# Patient Record
Sex: FEMALE | Race: NATIVE HAWAIIAN OR OTHER PACIFIC ISLANDER | HISPANIC OR LATINO
[De-identification: names, ages, dates, MRNs, and addresses within clinical notes are randomized per-mention and may not be internally consistent; named-entity substitution may affect disease eponyms.]

---

## 2020-10-08 PROBLEM — Z00.00 ENCOUNTER FOR PREVENTIVE HEALTH EXAMINATION: Status: ACTIVE | Noted: 2020-10-08

## 2020-11-04 ENCOUNTER — APPOINTMENT (OUTPATIENT)
Dept: CARDIOLOGY | Facility: CLINIC | Age: 75
End: 2020-11-04
Payer: MEDICARE

## 2020-11-04 ENCOUNTER — NON-APPOINTMENT (OUTPATIENT)
Age: 75
End: 2020-11-04

## 2020-11-04 VITALS
WEIGHT: 126 LBS | HEIGHT: 63 IN | DIASTOLIC BLOOD PRESSURE: 60 MMHG | HEART RATE: 67 BPM | SYSTOLIC BLOOD PRESSURE: 126 MMHG | TEMPERATURE: 98.7 F | BODY MASS INDEX: 22.32 KG/M2

## 2020-11-04 DIAGNOSIS — Z78.9 OTHER SPECIFIED HEALTH STATUS: ICD-10-CM

## 2020-11-04 DIAGNOSIS — R73.03 PREDIABETES.: ICD-10-CM

## 2020-11-04 DIAGNOSIS — Z87.898 PERSONAL HISTORY OF OTHER SPECIFIED CONDITIONS: ICD-10-CM

## 2020-11-04 DIAGNOSIS — G45.9 TRANSIENT CEREBRAL ISCHEMIC ATTACK, UNSPECIFIED: ICD-10-CM

## 2020-11-04 DIAGNOSIS — Z80.42 FAMILY HISTORY OF MALIGNANT NEOPLASM OF PROSTATE: ICD-10-CM

## 2020-11-04 DIAGNOSIS — E78.5 HYPERLIPIDEMIA, UNSPECIFIED: ICD-10-CM

## 2020-11-04 DIAGNOSIS — R00.2 PALPITATIONS: ICD-10-CM

## 2020-11-04 PROCEDURE — 99204 OFFICE O/P NEW MOD 45 MIN: CPT

## 2020-11-04 PROCEDURE — 93000 ELECTROCARDIOGRAM COMPLETE: CPT

## 2020-11-04 NOTE — REASON FOR VISIT
[Consultation] : a consultation regarding [Hyperlipidemia] : hyperlipidemia [Palpitations] : palpitations [FreeTextEntry1] : 75-year-old female was seen in the office referred for consultation in presence of episode of palpitation.  Lasting for many minutes.  Associated with feeling very cold.\par Emergency room evaluation was negative.\par Overall she is very active at baseline.  3 times a week walking 2 miles at a time.  Can go up and down 1 or 2 flights of stairs.  She has no chest pain PND orthopnea pedal edema\par She has no dizziness near syncopal or syncopal event.\par She has lost weight\par Recently she is not controlling her diet that well.\par \par Recently she has been diagnosed to have rheumatoid arthritis which is being treated\par She has hyperlipidemia which is diet controlled.\par She has history of TIA with negative work-up and no recurrence since 2008.  She is not on any medications.

## 2020-11-04 NOTE — ASSESSMENT
[FreeTextEntry1] : Reviewed November 3, 2020\par EKG normal sinus rhythm low voltage\par Labs September 30, 2020.  10-year ASCVD risk based on that was 19.2%.  CBC normal TSH 1.71 HDL 66  triglycerides 57 sodium 139 potassium 4.1 creatinine 0.75 Lyme titers negative A1c 6.0

## 2020-11-04 NOTE — DISCUSSION/SUMMARY
[FreeTextEntry1] : 75-year-old female with above medical history active medical problems as noted below\par 1.  Intermittent palpitation.  Further evaluation with 24-hour Holter monitor\par 2.  Intermittent palpitation feeling very weak and associated with feeling cold.  Assess evaluate LV ejection fraction wall motion valvular evaluation with echocardiogram.\par 3.  Reviewed hyperlipidemia 10-year ASCVD risk.  Discussed statin versus continue medical management with lifestyle modification.\par She has decided with lifestyle modification.  Understands benefits associated with taking statin therapy for prevention of atherosclerotic cardiovascular events over next 10 years.  She will follow with your office and if her risk continue to be higher consider statin therapy\par 4.  Prediabetes.  Again dietary restrictions discussed.  Regular activity exercise program reviewed.  Follow-up with your office regarding further improvement in the hyperglycemia and prediabetes.  Relationship with atherosclerotic vascular disease noted.\par 5.  Considering very good activity level without any symptoms decided against exercise treadmill stress test unless there are new symptoms or findings on echocardiogram to suggest silent ischemic heart disease.  She understands limitation of evaluation.\par \par We have recommended her to call 911 and go to the nearest emergency room if she has recurrent significant palpitation which make her weak tired and cold.\par Counseling regarding low saturated fat, salt and carbohydrate intake was reviewed. Active lifestyle and regular. Exercise along with weight management is advised.\par \par \par All the above were at length reviewed. Answered all the questions. Thank you very much for this kind referral. Please do not hesitate to give me a call for any question.\par Part of this transcription was done with voice recognition software and phonetically similar errors are common. I apologize for that. Please do not hesitate to call for any questions due to above.\par

## 2020-11-04 NOTE — PHYSICAL EXAM
[General Appearance - Well Developed] : well developed [Normal Appearance] : normal appearance [Well Groomed] : well groomed [General Appearance - Well Nourished] : well nourished [No Deformities] : no deformities [General Appearance - In No Acute Distress] : no acute distress [Normal Conjunctiva] : the conjunctiva exhibited no abnormalities [Eyelids - No Xanthelasma] : the eyelids demonstrated no xanthelasmas [Normal Oral Mucosa] : normal oral mucosa [No Oral Pallor] : no oral pallor [No Oral Cyanosis] : no oral cyanosis [Respiration, Rhythm And Depth] : normal respiratory rhythm and effort [Exaggerated Use Of Accessory Muscles For Inspiration] : no accessory muscle use [Auscultation Breath Sounds / Voice Sounds] : lungs were clear to auscultation bilaterally [Normal] : normal [No Precordial Heave] : no precordial heave was noted [Normal Rate] : normal [Normal S1] : normal S1 [Normal S2] : normal S2 [No Gallop] : no gallop heard [I] : a grade 1 [2+] : left 2+ [No Abnormalities] : the abdominal aorta was not enlarged and no bruit was heard [No Pitting Edema] : no pitting edema present [Abdomen Soft] : soft [Abdomen Tenderness] : non-tender [Abdomen Mass (___ Cm)] : no abdominal mass palpated [Abnormal Walk] : normal gait [Gait - Sufficient For Exercise Testing] : the gait was sufficient for exercise testing [Nail Clubbing] : no clubbing of the fingernails [Cyanosis, Localized] : no localized cyanosis [Petechial Hemorrhages (___cm)] : no petechial hemorrhages [Skin Color & Pigmentation] : normal skin color and pigmentation [] : no rash [No Venous Stasis] : no venous stasis [Skin Lesions] : no skin lesions [No Skin Ulcers] : no skin ulcer [No Xanthoma] : no  xanthoma was observed [Oriented To Time, Place, And Person] : oriented to person, place, and time [Affect] : the affect was normal [Mood] : the mood was normal [No Anxiety] : not feeling anxious [S3] : no S3 [S4] : no S4 [Right Carotid Bruit] : no bruit heard over the right carotid [Left Carotid Bruit] : no bruit heard over the left carotid [Right Femoral Bruit] : no bruit heard over the right femoral artery [Left Femoral Bruit] : no bruit heard over the left femoral artery

## 2020-11-12 ENCOUNTER — APPOINTMENT (OUTPATIENT)
Dept: CARDIOLOGY | Facility: CLINIC | Age: 75
End: 2020-11-12
Payer: MEDICARE

## 2020-11-12 PROCEDURE — 93306 TTE W/DOPPLER COMPLETE: CPT

## 2020-11-17 PROCEDURE — 93224 XTRNL ECG REC UP TO 48 HRS: CPT

## 2020-11-18 ENCOUNTER — NON-APPOINTMENT (OUTPATIENT)
Age: 75
End: 2020-11-18

## 2022-11-07 ENCOUNTER — APPOINTMENT (OUTPATIENT)
Dept: MAMMOGRAPHY | Facility: CLINIC | Age: 77
End: 2022-11-07

## 2022-11-07 PROCEDURE — 77067 SCR MAMMO BI INCL CAD: CPT

## 2022-11-07 PROCEDURE — 77063 BREAST TOMOSYNTHESIS BI: CPT

## 2023-06-06 ENCOUNTER — APPOINTMENT (OUTPATIENT)
Dept: ULTRASOUND IMAGING | Facility: CLINIC | Age: 78
End: 2023-06-06
Payer: MEDICARE

## 2023-06-06 PROCEDURE — 76830 TRANSVAGINAL US NON-OB: CPT

## 2023-11-09 ENCOUNTER — APPOINTMENT (OUTPATIENT)
Dept: MAMMOGRAPHY | Facility: CLINIC | Age: 78
End: 2023-11-09
Payer: MEDICARE

## 2023-11-09 PROCEDURE — 77063 BREAST TOMOSYNTHESIS BI: CPT

## 2023-11-09 PROCEDURE — 77067 SCR MAMMO BI INCL CAD: CPT

## 2024-08-14 ENCOUNTER — OFFICE (OUTPATIENT)
Dept: URBAN - METROPOLITAN AREA CLINIC 8 | Facility: CLINIC | Age: 79
Setting detail: OPHTHALMOLOGY
End: 2024-08-14
Payer: MEDICARE

## 2024-08-14 DIAGNOSIS — H25.13: ICD-10-CM

## 2024-08-14 DIAGNOSIS — H16.223: ICD-10-CM

## 2024-08-14 DIAGNOSIS — H40.013: ICD-10-CM

## 2024-08-14 DIAGNOSIS — H35.3111: ICD-10-CM

## 2024-08-14 DIAGNOSIS — H52.4: ICD-10-CM

## 2024-08-14 PROCEDURE — 92015 DETERMINE REFRACTIVE STATE: CPT | Performed by: OPHTHALMOLOGY

## 2024-08-14 PROCEDURE — 92133 CPTRZD OPH DX IMG PST SGM ON: CPT | Performed by: OPHTHALMOLOGY

## 2024-08-14 PROCEDURE — 92014 COMPRE OPH EXAM EST PT 1/>: CPT | Performed by: OPHTHALMOLOGY

## 2024-08-14 ASSESSMENT — CONFRONTATIONAL VISUAL FIELD TEST (CVF)
OS_FINDINGS: FULL
OD_FINDINGS: FULL

## 2025-06-09 ENCOUNTER — OFFICE (OUTPATIENT)
Dept: URBAN - METROPOLITAN AREA CLINIC 8 | Facility: CLINIC | Age: 80
Setting detail: OPHTHALMOLOGY
End: 2025-06-09
Payer: MEDICARE

## 2025-06-09 DIAGNOSIS — H25.13: ICD-10-CM

## 2025-06-09 DIAGNOSIS — H35.3111: ICD-10-CM

## 2025-06-09 DIAGNOSIS — H52.4: ICD-10-CM

## 2025-06-09 DIAGNOSIS — H40.013: ICD-10-CM

## 2025-06-09 DIAGNOSIS — H16.223: ICD-10-CM

## 2025-06-09 PROCEDURE — 92015 DETERMINE REFRACTIVE STATE: CPT | Performed by: OPHTHALMOLOGY

## 2025-06-09 PROCEDURE — 92014 COMPRE OPH EXAM EST PT 1/>: CPT | Performed by: OPHTHALMOLOGY

## 2025-06-09 ASSESSMENT — REFRACTION_CURRENTRX
OD_SPHERE: +0.25
OS_OVR_VA: 20/
OD_VPRISM_DIRECTION: SV
OD_CYLINDER: -1.25
OD_VPRISM_DIRECTION: SV
OS_VPRISM_DIRECTION: SV
OD_OVR_VA: 20/
OS_SPHERE: +0.25
OD_CYLINDER: -1.25
OS_OVR_VA: 20/
OS_SPHERE: +2.25
OD_SPHERE: +0.75
OS_SPHERE: PLANO
OS_AXIS: 005
OS_CYLINDER: -1.75
OD_AXIS: 092
OD_VPRISM_DIRECTION: SV
OS_AXIS: 090
OD_OVR_VA: 20/
OD_SPHERE: +2.25
OD_CYLINDER: -1.50
OD_AXIS: 079
OS_AXIS: 092
OS_CYLINDER: -1.25
OD_AXIS: 090
OS_OVR_VA: 20/
OD_CYLINDER: -1.75
OS_VPRISM_DIRECTION: SV
OS_SPHERE: +2.25
OD_SPHERE: +2.25
OS_AXIS: 088
OS_VPRISM_DIRECTION: SV
OD_AXIS: 092
OS_CYLINDER: -1.25
OS_VPRISM_DIRECTION: SV
OS_CYLINDER: -1.50
OD_VPRISM_DIRECTION: SV
OD_OVR_VA: 20/

## 2025-06-09 ASSESSMENT — REFRACTION_MANIFEST
OS_SPHERE: +2.00
OD_VA1: 20/30-
OD_VA2: 20/20(J1+)
OU_VA: 20/30
OS_VA1: 20/30-
OS_VA2: 20/20(J1+)
OD_ADD: +2.50
OS_SPHERE: PLANO
OD_CYLINDER: -1.25
OD_VA1: 20/30-
OS_ADD: +2.50
OU_VA: 20/30+3
OD_ADD: +3.00
OD_SPHERE: +0.75
OS_AXIS: 100
OS_CYLINDER: -1.25
OS_VA2: 20/20(J1+)
OD_VA2: 20/20(J1+)
OS_CYLINDER: -1.75
OD_CYLINDER: -1.75
OD_SPHERE: +2.25
OD_AXIS: 095
OS_ADD: +3.00
OD_AXIS: 095
OS_AXIS: 095
OS_VA1: 20/40-

## 2025-06-09 ASSESSMENT — KERATOMETRY
OS_AXISANGLE_DEGREES: 004
OS_K2POWER_DIOPTERS: 46.50
OD_AXISANGLE_DEGREES: 015
OD_K2POWER_DIOPTERS: 47.50
OD_K1POWER_DIOPTERS: 45.75
OS_K1POWER_DIOPTERS: 45.25

## 2025-06-09 ASSESSMENT — REFRACTION_AUTOREFRACTION
OS_SPHERE: +0.50
OD_CYLINDER: -3.50
OS_AXIS: 096
OD_SPHERE: +1.75
OS_CYLINDER: -3.00
OD_AXIS: 094

## 2025-06-09 ASSESSMENT — VISUAL ACUITY
OD_BCVA: 20/60
OS_BCVA: 20/30

## 2025-06-09 ASSESSMENT — CONFRONTATIONAL VISUAL FIELD TEST (CVF)
OS_FINDINGS: FULL
OD_FINDINGS: FULL